# Patient Record
(demographics unavailable — no encounter records)

---

## 2025-01-08 NOTE — HISTORY OF PRESENT ILLNESS
[FreeTextEntry1] : 71-year-old woman referred by Dr. Armstrong because of proteinuria and modestly reduced GFR after 20 or more years of hypertension.  Her office systolic BP is often slightly elevated, which we believed to be largely whitecoat effect.  Her BP was 132/76 when she saw Dr. Pereira for pulmonary evaluation on September 6.  She felt that dyspnea was out of proportion to any obstruction seen on PFT, and cardiac evaluation was unremarkable, so the conclusion was that deconditioning and obesity were the main reasons for dyspnea.  She saw Dr. QUIROZ on November 8 with a BP of 146/61.  She was generally stable at that time.  She saw her gastroenterology PA on November 13 for GERD follow-up with no particular new findings.  Her renal function is stable, with creatinine actually down to 1.09 on November 1, BUN 17, GFR 54 by creatinine but only 32 by Cystatin C, K4.4, CO2 32, and her urine microalbumin was normal at 7.  Her BP is treated with nifedipine 90 mg daily and losartan 100 mg daily.  She has not exhibited edema despite the increase in dose of nifedipine from 60 up to 90 mg.

## 2025-01-08 NOTE — ASSESSMENT
[FreeTextEntry1] : 71-year-old woman with stable renal function, resolution of microalbuminuria, but apparent lack of control of systolic BP.  I am reluctant to label today's systolic of 156 as simply whitecoat effect.  I am adding chlorthalidone 25 mg Monday Wednesday Friday and we will recheck labs and BP in 2 months.  She is to see Dr. Armstrong on January 17.

## 2025-03-05 NOTE — CONSULT LETTER
[Dear  ___] : Dear  [unfilled], [Consult Letter:] : I had the pleasure of evaluating your patient, [unfilled]. [Please see my note below.] : Please see my note below. [Consult Closing:] : Thank you very much for allowing me to participate in the care of this patient.  If you have any questions, please do not hesitate to contact me. [Sincerely,] : Sincerely, [FreeTextEntry2] : Dr Armstrong [FreeTextEntry3] : Sincerely,   Alphonso Vanegas MD, FACP

## 2025-03-05 NOTE — ASSESSMENT
[FreeTextEntry1] : 71-year-old woman with stable renal function, resolution of microalbuminuria, and apparently good control of hypertension.  Her first reading was slightly elevated but after deep breathing and a brief weight, her BP came down to 132/75.  She will return in 6 months and will follow-up shortly with Dr. QUIROZ.  I will recheck her BMP, Cystatin C, and PTH prior to her next visit.

## 2025-03-05 NOTE — HISTORY OF PRESENT ILLNESS
[FreeTextEntry1] : 71-year-old woman referred by Dr. Armstrong because of proteinuria and modestly reduced GFR with a history of hypertension for 20+ years.  Her office systolic is often elevated, but definitely represents a degree of whitecoat effect.  She did see Dr. QUIROZ on January 17, with a BP of 162/80 but she was experiencing left leg cramps at the time.  She underwent vascular testing which was normal.  Her hypertension is managed with nifedipine 90 mg daily and losartan 100 mg daily.  She does not have edema as a result of the CCB.  Her microalbuminuria has normalized with the use of optimal dose ARB.  Her renal function has actually improved slightly with creatinine falling down to 0.98, BUN 20, GFR 62 by creatinine, but only 31 by Cystatin C.  Her urine microalbumin is normal at 6.  I started her on calcitriol because of secondary hyperparathyroidism.  She feels generally well.

## 2025-03-07 NOTE — ASSESSMENT
[FreeTextEntry1] : Data reviewed:  PA/lat CXR in office 2/16/22: clear lungs, no infiltrate or effusion  [place] 4/2017: normal PFT 06/07/2021 : mod obstruction, no sig response (57-->63%), TLC 77%, DLCO 86% [place] 8/29/2023: best is normal, FEV1 77% [place] 9/6/2024: mild obstruction, FEV1 71% / FENO 12  Impression: Asthma/COPD overlap  Plan: Cont Trelegy 100 but discussed talking to insurance about cheaper options for Breztri or for a combo of inhalers to achieve triple tx. She will message us. Rec RSV vaccine. Call [facility] again for rehab. See her back in 6 mos.

## 2025-03-07 NOTE — CONSULT LETTER
[Dear  ___] : Dear  [unfilled], [Courtesy Letter:] : I had the pleasure of seeing your patient, [unfilled], in my office today. [Please see my note below.] : Please see my note below. [Consult Closing:] : Thank you very much for allowing me to participate in the care of this patient.  If you have any questions, please do not hesitate to contact me. [Sincerely,] : Sincerely, [FreeTextEntry2] : Jory Armstrong M.D.\par  [address] [place] [address]\[place] [id number] [FreeTextEntry3] : Cammie Pereira MD, FCCP\par

## 2025-03-07 NOTE — HISTORY OF PRESENT ILLNESS
[Never] : never [TextBox_4] : My patient since 2021 with asthma, never smoker, inadequately controlled on ICS alone, stepped up to ICS/LABA, subsequently to triple for persistent dyspnea. 1972 had an exploratory thoracotomy for a cystic mass, removal of benign lesion. Retired .  9/6/2024: Still a little dyspnea. Can walk 2 blocks on a good day. Lives in [place], was walking her granddaughter to school about 5 blocks away but quit doing this because too dyspneic, now takes the bus. Takes Trelegy 100 every day. Sees Dr Lugo now for cardiology who says nothing wrong w her heart. Seeing her next month. HCO3 was 33 yesterday but 98% on RA to me. She retired a year before Covid; prior to that walked 7 blocks to work without much trouble. No new medical problems. Travels w [employer] due to knees.  3/7/2025: Continues on Trelegy every day paying $160 for one month. No interval exacerbations or infections. One episode of needing neb in the extreme cold. Did not attend pulm rehab - thinks she called [facility] because she takes her autistic son down there for dentistry.